# Patient Record
Sex: FEMALE | Race: WHITE | URBAN - METROPOLITAN AREA
[De-identification: names, ages, dates, MRNs, and addresses within clinical notes are randomized per-mention and may not be internally consistent; named-entity substitution may affect disease eponyms.]

---

## 2017-10-26 ENCOUNTER — FOLLOW-UP (OUTPATIENT)
Dept: URBAN - METROPOLITAN AREA CLINIC 14 | Facility: CLINIC | Age: 72
Setting detail: DERMATOLOGY
End: 2017-10-26

## 2017-10-26 DIAGNOSIS — L82.1 OTHER SEBORRHEIC KERATOSIS: ICD-10-CM

## 2017-10-26 DIAGNOSIS — D22.5 MELANOCYTIC NEVI OF TRUNK: ICD-10-CM

## 2017-10-26 DIAGNOSIS — D22.61 MELANOCYTIC NEVI OF RIGHT UPPER LIMB, INCLUDING SHOULDER: ICD-10-CM

## 2017-10-26 DIAGNOSIS — D22.71 MELANOCYTIC NEVI OF RIGHT LOWER LIMB, INCLUDING HIP: ICD-10-CM

## 2017-10-26 PROCEDURE — 17110 DESTRUCT B9 LESION 1-14: CPT

## 2017-10-26 PROCEDURE — 99213 OFFICE O/P EST LOW 20 MIN: CPT

## 2017-10-26 PROCEDURE — 17000 DESTRUCT PREMALG LESION: CPT

## 2017-10-26 PROCEDURE — 17003 DESTRUCT PREMALG LES 2-14: CPT

## 2018-07-03 ENCOUNTER — OTHER- (OUTPATIENT)
Dept: URBAN - METROPOLITAN AREA CLINIC 14 | Facility: CLINIC | Age: 73
Setting detail: DERMATOLOGY
End: 2018-07-03

## 2018-07-03 DIAGNOSIS — Z85.828 PERSONAL HISTORY OF OTHER MALIGNANT NEOPLASM OF SKIN: ICD-10-CM

## 2018-07-03 DIAGNOSIS — L21.8 OTHER SEBORRHEIC DERMATITIS: ICD-10-CM

## 2018-07-03 DIAGNOSIS — D18.01 HEMANGIOMA OF SKIN AND SUBCUTANEOUS TISSUE: ICD-10-CM

## 2018-07-03 DIAGNOSIS — L81.4 OTHER MELANIN HYPERPIGMENTATION: ICD-10-CM

## 2018-07-03 DIAGNOSIS — L82.1 OTHER SEBORRHEIC KERATOSIS: ICD-10-CM

## 2018-07-03 PROBLEM — Z85828 V10.83: Status: ACTIVE | Noted: 2018-07-03

## 2018-07-03 PROCEDURE — 17110 DESTRUCT B9 LESION 1-14: CPT

## 2018-10-25 ENCOUNTER — FOLLOW-UP (OUTPATIENT)
Dept: URBAN - METROPOLITAN AREA CLINIC 14 | Facility: CLINIC | Age: 73
Setting detail: DERMATOLOGY
End: 2018-10-25

## 2018-10-25 PROBLEM — Z85828 V10.83: Status: ACTIVE | Noted: 2018-10-25

## 2018-10-25 PROCEDURE — 99214 OFFICE O/P EST MOD 30 MIN: CPT

## 2018-10-25 PROCEDURE — 17110 DESTRUCT B9 LESION 1-14: CPT

## 2019-10-24 ENCOUNTER — COMPLETE SKIN EXAM (OUTPATIENT)
Dept: URBAN - METROPOLITAN AREA CLINIC 14 | Facility: CLINIC | Age: 74
Setting detail: DERMATOLOGY
End: 2019-10-24

## 2019-10-24 DIAGNOSIS — L70.0 ACNE VULGARIS: ICD-10-CM

## 2019-10-24 DIAGNOSIS — D48.5 NEOPLASM OF UNCERTAIN BEHAVIOR OF SKIN: ICD-10-CM

## 2019-10-24 PROBLEM — Z85828 V10.83: Status: ACTIVE | Noted: 2019-10-24

## 2019-10-24 PROCEDURE — 99213 OFFICE O/P EST LOW 20 MIN: CPT

## 2019-10-24 PROCEDURE — 17110 DESTRUCT B9 LESION 1-14: CPT

## 2022-06-01 ENCOUNTER — OTHER- (OUTPATIENT)
Dept: URBAN - METROPOLITAN AREA CLINIC 14 | Facility: CLINIC | Age: 77
Setting detail: DERMATOLOGY
End: 2022-06-01

## 2022-06-01 DIAGNOSIS — D18.01 HEMANGIOMA OF SKIN AND SUBCUTANEOUS TISSUE: ICD-10-CM

## 2022-06-01 DIAGNOSIS — L81.4 OTHER MELANIN HYPERPIGMENTATION: ICD-10-CM

## 2022-06-01 DIAGNOSIS — D22.5 MELANOCYTIC NEVI OF TRUNK: ICD-10-CM

## 2022-06-01 PROCEDURE — 17110 DESTRUCT B9 LESION 1-14: CPT

## 2022-10-21 ENCOUNTER — APPOINTMENT (OUTPATIENT)
Dept: URBAN - METROPOLITAN AREA CLINIC 210 | Age: 77
Setting detail: DERMATOLOGY
End: 2022-10-21

## 2022-10-21 DIAGNOSIS — L24 IRRITANT CONTACT DERMATITIS: ICD-10-CM

## 2022-10-21 PROBLEM — L24.9 IRRITANT CONTACT DERMATITIS, UNSPECIFIED CAUSE: Status: ACTIVE | Noted: 2022-10-21

## 2022-10-21 PROCEDURE — OTHER PRESCRIPTION SAMPLES PROVIDED: OTHER

## 2022-10-21 PROCEDURE — 99213 OFFICE O/P EST LOW 20 MIN: CPT

## 2022-10-21 PROCEDURE — OTHER MIPS QUALITY: OTHER

## 2022-10-21 PROCEDURE — OTHER COUNSELING: OTHER

## 2022-10-21 ASSESSMENT — LOCATION ZONE DERM
LOCATION ZONE: FACE
LOCATION ZONE: EYELID

## 2022-10-21 ASSESSMENT — LOCATION DETAILED DESCRIPTION DERM
LOCATION DETAILED: RIGHT LATERAL SUPERIOR EYELID
LOCATION DETAILED: LEFT LATERAL SUPERIOR EYELID
LOCATION DETAILED: RIGHT CENTRAL EYEBROW

## 2022-10-21 ASSESSMENT — LOCATION SIMPLE DESCRIPTION DERM
LOCATION SIMPLE: RIGHT EYEBROW
LOCATION SIMPLE: RIGHT SUPERIOR EYELID
LOCATION SIMPLE: LEFT SUPERIOR EYELID

## 2022-10-21 NOTE — PROCEDURE: PRESCRIPTION SAMPLES PROVIDED
Samples Given: Triamcinolone cream 3 times daily for 2 days, 2 times daily for 2 days, once daily for 2 days.
Detail Level: Zone

## 2022-10-21 NOTE — PROCEDURE: MIPS QUALITY
Quality 110: Preventive Care And Screening: Influenza Immunization: Influenza Immunization Administered during Influenza season
Quality 431: Preventive Care And Screening: Unhealthy Alcohol Use - Screening: Patient not identified as an unhealthy alcohol user when screened for unhealthy alcohol use using a systematic screening method
Detail Level: Detailed
Quality 111:Pneumonia Vaccination Status For Older Adults: Pneumococcal vaccine (PPSV23) administered on or after patient’s 60th birthday and before the end of the measurement period
Quality 226: Preventive Care And Screening: Tobacco Use: Screening And Cessation Intervention: Patient screened for tobacco use and is an ex/non-smoker
Quality 130: Documentation Of Current Medications In The Medical Record: Current Medications Documented